# Patient Record
Sex: MALE | Race: WHITE | NOT HISPANIC OR LATINO | Employment: FULL TIME | URBAN - METROPOLITAN AREA
[De-identification: names, ages, dates, MRNs, and addresses within clinical notes are randomized per-mention and may not be internally consistent; named-entity substitution may affect disease eponyms.]

---

## 2021-04-19 NOTE — PATIENT INSTRUCTIONS
Preventive Health Recommendations  Male Ages 50 - 64    Yearly exam:             See your health care provider every year in order to  o   Review health changes.   o   Discuss preventive care.    o   Review your medicines if your doctor has prescribed any.     Have a cholesterol test every 5 years, or more frequently if you are at risk for high cholesterol/heart disease.     Have a diabetes test (fasting glucose) every three years. If you are at risk for diabetes, you should have this test more often.     Have a colonoscopy at age 50, or have a yearly FIT test (stool test). These exams will check for colon cancer.      Talk with your health care provider about whether or not a prostate cancer screening test (PSA) is right for you.    You should be tested each year for STDs (sexually transmitted diseases), if you re at risk.     Shots: Get a flu shot each year. Get a tetanus shot every 10 years.     Nutrition:    Eat at least 5 servings of fruits and vegetables daily.     Eat whole-grain bread, whole-wheat pasta and brown rice instead of white grains and rice.     Get adequate Calcium and Vitamin D.     Lifestyle    Exercise for at least 150 minutes a week (30 minutes a day, 5 days a week). This will help you control your weight and prevent disease.     Limit alcohol to one drink per day.     No smoking.     Wear sunscreen to prevent skin cancer.     See your dentist every six months for an exam and cleaning.     See your eye doctor every 1 to 2 years.      Please schedule a future laboratory appointment(s) and be sure to have fasted for 10 hours prior to arrival      While the [facility] has opened eligibility to all people, our health system will continue to prioritize those groups who are most at risk of exposure to COVID-19 and/or hospitalization due to COVID-19. Once a larger percentage of these groups are vaccinated, we will open vaccine appointments to a larger group.      We are working hard to begin vaccinating  more people against COVID-19. Beginning Wednesday, March 31, we are vaccinating:     Individuals age 50 and older.    All healthcare workers, both paid and unpaid.     People age 16 or 18-49 years with certain medical conditions or disabilities listed in Phase 1b tier 4.    People who identify as one or more of the following high-risk groups: Black/, /[place] Native, [place] , /, and /.     If you fit into one of these categories, please log in to [facility] using this link to see if we have an open appointment and schedule an appointment.      If there are no appointments left, you will be unable to schedule.   If you have technical difficulty using [facility], call [phone] for assistance.      As vaccine supply increases and we are able to open appointments to more groups, we will share that information on our website:  [email or web address]. Check this website to stay up to date on COVID-19 vaccination information.          Patient Education     Preventing Osteoporosis: Meeting Your Calcium Needs    Your body needs calcium to build and repair bones. But it can't make calcium on its own. That's why it's important to eat calcium-rich foods. Some foods are naturally rich in calcium. Others have calcium added (fortified). It's best to get calcium from the foods you eat. But if you can't get enough, you may want to take calcium supplements. To meet your daily calcium needs, try the foods listed below.  Dairy Fish & beans Other sources   Source   Calcium (mg) per serving   Source   Calcium (mg) per serving   Source   Calcium (mg) per serving   Low-fat yogurt, plain   415 mg/8 oz.   Sardines, Atlantic, canned, with bones   351 mg/3 oz.   Oatmeal, instant, fortified   215 mg/1 cup   Nonfat milk   302 mg/1 cup   [place], sockeye, canned, with bones   239 mg/3 oz.   Tofu made with calcium sulfate   204 mg/3 oz.   Low-fat  milk   297 mg/1 cup   Soybeans, fresh, boiled   131 mg/1/2 cup   Collards   179 mg/1/2 cup   Swiss cheese   272 mg/1 oz.   White beans, cooked   81 mg/1/2 cup   English muffin, whole wheat   175 mg/1 muffin   Cheddar cheese   205 mg/1 oz.   Navy beans, cooked   79 mg/1/2 cup   Kale   90 mg/1/2 cup   Ice cream strawberry   79 mg/1/2 cup           Orange, navel   56 mg/1 medium   Note: Calcium levels may vary depending on brand and size.  Daily calcium needs  14 to 18 years old: 1,300 mg  19 to 30 years old: 1,000 mg  31 to 50 years old: 1,000 mg  51 to 70 years old, women: 1,200 mg  51 to 70 years old, men: 1,000 mg  Pregnant or nursin to 18 years old: 1,300 mg, 19 to 50 years old: 1,000 mg  Older than 70 (women and men): 1,200 mg   [employer] last reviewed this educational content on 2018-2021 [employer]. All rights reserved. This information is not intended as a substitute for professional medical care. Always follow your healthcare professional's instructions.

## 2021-04-19 NOTE — PROGRESS NOTES
"  3  SUBJECTIVE:   CC: Modesto Acevedo is an 55 year old male who presents for preventive health visit.     {Split Bill scripting  The purpose of this visit is to discuss your medical history and prevent health problems before you are sick. You may be responsible for a co-pay, coinsurance, or deductible if your visit today includes services such as checking on a sore throat, having an x-ray or lab test, or treating and evaluating a new or existing condition :373270}  Patient has been advised of split billing requirements and indicates understanding: {Yes and No:044297}  Healthy Habits:    Do you get at least three servings of calcium containing foods daily (dairy, green leafy vegetables, etc.)? { :863610::\"yes\"}    Amount of exercise or daily activities, outside of work: { :236276}    Problems taking medications regularly { :187885::\"No\"}    Medication side effects: { :318703::\"No\"}    Have you had an eye exam in the past two years? { :218219}    Do you see a dentist twice per year? { :381224}    Do you have sleep apnea, excessive snoring or daytime drowsiness?{ :171053}  {Outside tests to abstract? :378248}    {additional problems to add (Optional):384101}    Today's PHQ-2 Score: No flowsheet data found.  {PHQ-2 LOOK IN ASSESSMENTS (Optional) :190667}  Abuse: Current or Past(Physical, Sexual or Emotional)- {YES/NO/NA:108118}  Do you feel safe in your environment? {YES/NO/NA:367459}    Have you ever done Advance Care Planning? (For example, a Health Directive, POLST, or a discussion with a medical provider or your loved ones about your wishes): { :027281}    Social History     Tobacco Use     Smoking status: Not on file   Substance Use Topics     Alcohol use: Not on file     If you drink alcohol do you typically have >3 drinks per day or >7 drinks per week? {ETOH :987492}                      Last PSA: No results found for: PSA    Reviewed orders with patient. Reviewed health maintenance and updated orders accordingly " "- {Yes/No:844244::\"Yes\"}  {Chronicprobdata (Optional):117850}    Reviewed and updated as needed this visit by clinical staff                 Reviewed and updated as needed this visit by Provider                {HISTORY OPTIONS (Optional):979922}    ROS:  { :586133::\"CONSTITUTIONAL: NEGATIVE for fever, chills, change in weight\",\"INTEGUMENTARY/SKIN: NEGATIVE for worrisome rashes, moles or lesions\",\"EYES: NEGATIVE for vision changes or irritation\",\"ENT: NEGATIVE for ear, mouth and throat problems\",\"RESP: NEGATIVE for significant cough or SOB\",\"CV: NEGATIVE for chest pain, palpitations or peripheral edema\",\"GI: NEGATIVE for nausea, abdominal pain, heartburn, or change in bowel habits\",\" male: negative for dysuria, hematuria, decreased urinary stream, erectile dysfunction, urethral discharge\",\"MUSCULOSKELETAL: NEGATIVE for significant arthralgias or myalgia\",\"NEURO: NEGATIVE for weakness, dizziness or paresthesias\",\"PSYCHIATRIC: NEGATIVE for changes in mood or affect\"}    OBJECTIVE:   There were no vitals taken for this visit.  EXAM:  {Exam Choices:727125}    {Diagnostic Test Results (Optional):168673::\"Diagnostic Test Results:\",\"Labs reviewed in Epic\"}    ASSESSMENT/PLAN:   {Diag Picklist:401407}    Patient has been advised of split billing requirements and indicates understanding: {YES / NO:078490::\"Yes\"}  COUNSELING:  {MALE COUNSELING MESSAGES:695899::\"Reviewed preventive health counseling, as reflected in patient instructions\"}    There is no height or weight on file to calculate BMI.    {Weight Management Plan (ACO) Complete if BMI is abnormal-  Ages 18-64  BMI >24.9.  Age 65+ with BMI <23 or >30 (Optional):373289}    He has no history on file for tobacco.      Counseling Resources:  ATP IV Guidelines  Pooled Cohorts Equation Calculator  FRAX Risk Assessment  ICSI Preventive Guidelines  Dietary Guidelines for Americans, 2010  USDA's MyPlate  ASA Prophylaxis  Lung CA Screening    Wilbert Canales MD   HEALTH " [facility]

## 2021-04-20 ENCOUNTER — OFFICE VISIT (OUTPATIENT)
Dept: FAMILY MEDICINE | Facility: CLINIC | Age: 56
End: 2021-04-20
Payer: COMMERCIAL

## 2021-04-20 VITALS
DIASTOLIC BLOOD PRESSURE: 74 MMHG | HEIGHT: 68 IN | TEMPERATURE: 97.7 F | HEART RATE: 59 BPM | SYSTOLIC BLOOD PRESSURE: 123 MMHG | WEIGHT: 194 LBS | OXYGEN SATURATION: 97 % | BODY MASS INDEX: 29.4 KG/M2

## 2021-04-20 DIAGNOSIS — N40.1 BENIGN PROSTATIC HYPERPLASIA WITH NOCTURIA: ICD-10-CM

## 2021-04-20 DIAGNOSIS — Z00.00 ROUTINE GENERAL MEDICAL EXAMINATION AT A HEALTH CARE FACILITY: Primary | ICD-10-CM

## 2021-04-20 DIAGNOSIS — Z83.3 FAMILY HISTORY OF DIABETES MELLITUS: ICD-10-CM

## 2021-04-20 DIAGNOSIS — Z11.59 ENCOUNTER FOR HEPATITIS C SCREENING TEST FOR LOW RISK PATIENT: ICD-10-CM

## 2021-04-20 DIAGNOSIS — Z12.11 SCREEN FOR COLON CANCER: ICD-10-CM

## 2021-04-20 DIAGNOSIS — R35.1 BENIGN PROSTATIC HYPERPLASIA WITH NOCTURIA: ICD-10-CM

## 2021-04-20 DIAGNOSIS — Z29.9 PREVENTIVE MEASURE: ICD-10-CM

## 2021-04-20 DIAGNOSIS — Z13.220 SCREENING FOR HYPERLIPIDEMIA: ICD-10-CM

## 2021-04-20 PROCEDURE — 99386 PREV VISIT NEW AGE 40-64: CPT | Performed by: FAMILY MEDICINE

## 2021-04-20 RX ORDER — SILDENAFIL CITRATE 20 MG/1
TABLET ORAL
COMMUNITY
Start: 2019-06-07

## 2021-04-20 RX ORDER — DUTASTERIDE 0.5 MG/1
0.5 CAPSULE, LIQUID FILLED ORAL DAILY
Qty: 90 CAPSULE | Refills: 3 | Status: SHIPPED | OUTPATIENT
Start: 2021-04-20

## 2021-04-20 SDOH — HEALTH STABILITY: MENTAL HEALTH: HOW OFTEN DO YOU HAVE 6 OR MORE DRINKS ON ONE OCCASION?: NEVER

## 2021-04-20 SDOH — HEALTH STABILITY: MENTAL HEALTH: HOW MANY STANDARD DRINKS CONTAINING ALCOHOL DO YOU HAVE ON A TYPICAL DAY?: NOT ASKED

## 2021-04-20 SDOH — HEALTH STABILITY: MENTAL HEALTH: HOW OFTEN DO YOU HAVE A DRINK CONTAINING ALCOHOL?: NEVER

## 2021-04-20 ASSESSMENT — ENCOUNTER SYMPTOMS
CHILLS: 0
HEADACHES: 0
HEMATURIA: 0
ABDOMINAL PAIN: 0
MYALGIAS: 0
JOINT SWELLING: 0
FEVER: 0
EYE PAIN: 0
DIZZINESS: 0
FREQUENCY: 1
HEMATOCHEZIA: 1
NAUSEA: 0
DYSURIA: 0
COUGH: 0
WEAKNESS: 0
PALPITATIONS: 0
ARTHRALGIAS: 0
PARESTHESIAS: 0
SORE THROAT: 0
NERVOUS/ANXIOUS: 1
HEARTBURN: 0
CONSTIPATION: 0
DIARRHEA: 0
SHORTNESS OF BREATH: 0

## 2021-04-20 ASSESSMENT — PAIN SCALES - GENERAL: PAINLEVEL: NO PAIN (0)

## 2021-04-20 ASSESSMENT — MIFFLIN-ST. JEOR: SCORE: 1675.51

## 2021-04-20 NOTE — NURSING NOTE
"Chief Complaint   Patient presents with     Physical     Establish Care     Health Maintenance     PHQ2, Adv Dir,       Initial /77   Pulse 59   Temp 97.7  F (36.5  C) (Tympanic)   Ht 1.721 m (5' 7.75\")   Wt 88 kg (194 lb)   SpO2 97%   BMI 29.72 kg/m   Estimated body mass index is 29.72 kg/m  as calculated from the following:    Height as of this encounter: 1.721 m (5' 7.75\").    Weight as of this encounter: 88 kg (194 lb).  Medication Reconciliation: complete  Tonya Camacho CMA  "

## 2021-04-20 NOTE — PROGRESS NOTES
SUBJECTIVE:   CC: Modesto Acevedo is an 55 year old male who presents for preventative health visit.       Patient has been advised of split billing requirements and indicates understanding: Yes  Healthy Habits:     Getting at least 3 servings of Calcium per day:  Yes    Bi-annual eye exam:  Yes    Dental care twice a year:  Yes    Sleep apnea or symptoms of sleep apnea:  None    Diet:  Regular (no restrictions)    Frequency of exercise:  6-7 days/week    Duration of exercise:  Greater than 60 minutes    Taking medications regularly:  Yes    Medication side effects:  Not applicable    PHQ-2 Total Score: 2    Additional concerns today:  No              Today's PHQ-2 Score:   PHQ-2 ( 1999 Pf[employer]) 4/20/2021   Q1: Little interest or pleasure in doing things 1   Q2: Feeling down, depressed or hopeless 1   PHQ-2 Score 2   Q1: Little interest or pleasure in doing things Several days   Q2: Feeling down, depressed or hopeless Several days   PHQ-2 Score 2       Abuse: Current or Past(Physical, Sexual or Emotional)- No  Do you feel safe in your environment? Yes    Have you ever done Advance Care Planning? (For example, a Health Directive, POLST, or a discussion with a medical provider or your loved ones about your wishes): No, advance care planning information given to patient to review.  Advanced care planning was discussed at today's visit.    Social History     Tobacco Use     Smoking status: Former Smoker     Smokeless tobacco: Never Used   Substance Use Topics     Alcohol use: Never     Frequency: Never     Binge frequency: Never         Alcohol Use 4/20/2021   Prescreen: >3 drinks/day or >7 drinks/week? Not Applicable       Last PSA: No results found for: PSA    Reviewed orders with patient. Reviewed health maintenance and updated orders accordingly -       Reviewed and updated as needed this visit by clinical staff  Tobacco  Allergies  Meds   Med Hx  Surg Hx  Fam Hx  Soc Hx        Reviewed and updated as needed this  "visit by Provider  Tobacco                   Review of Systems   Constitutional: Negative for chills and fever.   HENT: Negative for congestion, ear pain, hearing loss and sore throat.    Eyes: Negative for pain and visual disturbance.   Respiratory: Negative for cough and shortness of breath.    Cardiovascular: Negative for chest pain, palpitations and peripheral edema.   Gastrointestinal: Positive for hematochezia. Negative for abdominal pain, constipation, diarrhea, heartburn and nausea.   Genitourinary: Positive for frequency and urgency. Negative for discharge, dysuria, genital sores and hematuria.   Musculoskeletal: Negative for arthralgias, joint swelling and myalgias.   Skin: Negative for rash.   Neurological: Negative for dizziness, weakness, headaches and paresthesias.   Psychiatric/Behavioral: Negative for mood changes. The patient is nervous/anxious.          OBJECTIVE:   /74   Pulse 59   Temp 97.7  F (36.5  C) (Tympanic)   Ht 1.721 m (5' 7.75\")   Wt 88 kg (194 lb)   SpO2 97%   BMI 29.72 kg/m      Physical Exam          ASSESSMENT/PLAN:       ICD-10-CM    1. Routine general medical examination at a health care facility  Z00.00    2. Screen for colon cancer  Z12.11 GASTROENTEROLOGY ADULT REF PROCEDURE ONLY   3. Screening for hyperlipidemia  Z13.220 Lipid panel reflex to direct LDL Fasting   4. Encounter for hepatitis C screening test for low risk patient  Z11.59 Hepatitis C Screen Reflex to HCV RNA Quant and Genotype   5. Family history of diabetes mellitus  Z83.3 Comprehensive metabolic panel   6. Preventive measure  Z29.9 aspirin (ASA) 81 MG EC tablet   7. Benign prostatic hyperplasia with nocturia  N40.1 dutasteride (AVODART) 0.5 MG capsule    R35.1        Patient has been advised of split billing requirements and indicates understanding: Yes  COUNSELING:   Reviewed preventive health counseling, as reflected in patient instructions       Regular exercise       Healthy diet/nutrition       " "Vision screening       Aspirin prophylaxis        Family planning       Consider Hep C screening for all patients one time for ages 18-79 years       Colon cancer screening       Prostate cancer screening       Osteoporosis prevention/bone health       One time pneumovax for smokers    Estimated body mass index is 29.72 kg/m  as calculated from the following:    Height as of this encounter: 1.721 m (5' 7.75\").    Weight as of this encounter: 88 kg (194 lb).     Weight management plan: Discussed healthy diet and exercise guidelines    He reports that he has quit smoking. He has never used smokeless tobacco.      Counseling Resources:  ATP IV Guidelines  Pooled Cohorts Equation Calculator  FRAX Risk Assessment  ICSI Preventive Guidelines  Dietary Guidelines for Americans, 2010  Dubaki's MyPlate  ASA Prophylaxis  Lung CA Screening    Wilbert Canales MD  [facility]  --------------------------------------------------------------------------------------------------------------------------------------  SUBJECTIVE:  Modesto Acevedo is a 55 year old male who presents to the clinic today for a routine physical exam.    The patient's last physical was  8-15-19    No results found for: CHOL  No results found for: HDL  No results found for: LDL  No results found for: TRIG  No results found for: CHOLHDLRATIO  The patient's last fasting lipid panel was done  years ago and the results are unknown to the patient.      The ASCVD Risk score (Ainsley SAVAGE Jr., et al., 2013) failed to calculate for the following reasons:    Cannot find a previous HDL lab    Cannot find a previous total cholesterol lab      Risk Enhancers:  Family history of premature ASCVD- No  LDL >159- Unknown  Chronic kidney disease- No  Metabolic Syndrome- No  Premature menopause- No  Inflammatory conditions (RA, psoriasis, HIV)- No  SE  Ancestry- No  Triglycerides >174- No      The patient reports that he has never been treated for high blood " pressure.    The patient reports that he does not take a daily aspirin.    No results found for: HCVAB  The patient reports that he has not been screened for Hepatitis C    (Screen all baby boomers once per CDC-- the generation born from 1945 through 1965 and per USPTF screen age 19 to 79 especially younger people who have used IV drugs)  He would like to have an Hepatitis C test today    No results found for: HIAGAB  The patient reports that he has not been screened for HIV   (Screen all 15 to 64 years old)  He would not like to have an HIV test today      Immunization History   Administered Date(s) Administered     TDAP Vaccine (Adacel) 03/12/2009     Td (Adult), Adsorbed 08/15/2019     The patient's believes that his last tetanus shot was given 2 year(s) ago.   The patient believes that he has not had a Shingrix in the past  The patient believes that he has not had a PPSV23 in the past.  The patient believes that he has not had a PCV13 in the past.  The patient believes that he has not had a seasonal flu vaccination this fall or winter.  The patient would like to have acv vaccinations asap      No results found for this or any previous visit.]   The patient denies a family history of colon cancer.  The patient reports that he has had a colonoscopy.20 years ago.  This was negative.   The patient reports that he and his wife or partner are not using contraception as she has gone through menopause.    The patient denies a family history of diabetes.  The patient reports a family history of prostate cancer in 2 grandfathers and an uncle.    After discussing the pros and cons of checking a PSA he  Does not want to have this test drawn today.   The patient reports that he eats or drinks 1 servings of dairy products per day. He does take a multivitamin with calcium once daily.  The patient reports that he has dental appointments approximately every 6 months.  The patient reports that he  has an eye examination  approximately every 2 year(s).    Do you currently smoke? No  How many years have you smoked? 5   How many packs per day did you smoke on average? 1 ppd  (if more than 30 pack year history and the patient is age 55-80 consider ordering an annual low dose radiation lung CT to screen for cancer)  (Do not order if patient has quit more than 15 years ago or has a health condition that limits life expectancy or could not tolerate curative lung surgery)  Are you interested having a lung CT to screen for lung cancer? N/A    If the patient has smoked more that 100 cigarettes, has the patient had an imaging study (US or CT) for an AAA between the ages of 65 and 75? N/A              There is no problem list on file for this patient.      History reviewed. No pertinent surgical history.    Family History   Problem Relation Age of Onset     Coronary Artery Disease Father      Heart Disease Father      Hypertension Father        Social History     Socioeconomic History     Marital status:      Spouse name: Not on file     Number of children: Not on file     Years of education: Not on file     Highest education level: Not on file   Occupational History     Not on file   Social Needs     Financial resource strain: Not on file     Food insecurity     Worry: Not on file     Inability: Not on file     Transportation needs     Medical: Not on file     Non-medical: Not on file   Tobacco Use     Smoking status: Former Smoker     Smokeless tobacco: Never Used   Substance and Sexual Activity     Alcohol use: Never     Frequency: Never     Binge frequency: Never     Drug use: Never     Sexual activity: Not on file   Lifestyle     Physical activity     Days per week: Not on file     Minutes per session: Not on file     Stress: Not on file   Relationships     Social connections     Talks on phone: Not on file     Gets together: Not on file     Attends Orthodoxy service: Not on file     Active member of club or organization: Not on  "file     Attends meetings of clubs or organizations: Not on file     Relationship status: Not on file     Intimate partner violence     Fear of current or ex partner: Not on file     Emotionally abused: Not on file     Physically abused: Not on file     Forced sexual activity: Not on file   Other Topics Concern     Not on file   Social History Narrative     Not on file       Current Outpatient Medications   Medication Sig Dispense Refill     Misc Natural Products (PROSTATE SUPPORT PO)        Multiple Vitamin (MULTI VITAMIN DAILY PO)        S-Adenosylmethionine (EDGAR-E COMPLETE PO)        sildenafil (REVATIO) 20 MG tablet take 1-2 tablets by mouth once daily if needed           PHYSICAL EXAMINATION:  Blood pressure 123/74, pulse 59, temperature 97.7  F (36.5  C), temperature source Tympanic, height 1.721 m (5' 7.75\"), weight 88 kg (194 lb), SpO2 97 %.  General appearance - healthy, alert and no distress  Skin - Skin color, texture, turgor normal. No rashes or lesions.  Head - Normocephalic. No masses, lesions, tenderness or abnormalities  Eyes - conjunctivae/corneas clear. PERRL, EOM's intact. Fundi benign  Ears - External ears normal. Canals clear. TM's normal.  Nose/Sinuses - Nares normal. Septum midline. Mucosa normal. No drainage or sinus tenderness.  Oropharynx - Lips, mucosa, and tongue normal. Teeth and gums normal.  Neck - Neck supple. No adenopathy. Thyroid symmetric, normal size,  Lungs - Percussion normal. Good diaphragmatic excursion. Lungs clear  Heart - PMI normal. No lifts, heaves, or thrills. RRR. No murmurs, clicks gallops or rub  Abdomen - Abdomen soft, non-tender. BS normal. No masses, organomegaly  Extremities - Extremities normal. No deformities, edema, or skin discoloration.  Musculoskeletal - Spine ROM normal. Muscular strength intact.  Peripheral pulses - radial=4/4, femoral=4/4, popliteal=4/4, dorsalis pedis=4/4,  Neuro - Gait normal. Reflexes normal and symmetric. Sensation grossly " WNL.  Genitalia - Penis normal. No urethral discharge. Scrotum normal to palpation. No hernia.  Rectal - Rectal negative. Prostate palpation negative.  No rectal masses or abnormalities, positive findings: prostate 3+      No results found for any previous visit.       ASSESSMENT:    ICD-10-CM    1. Routine general medical examination at a health care facility  Z00.00    2. Screen for colon cancer  Z12.11 Fecal colorectal cancer screen FIT - Future (S+30)   3. Screening for hyperlipidemia  Z13.220        Well-Adult Physical Exam.  Health Maintenance Due   Topic Date Due     COLORECTAL CANCER SCREENING  Never done     HIV SCREENING  Never done     COVID-19 Vaccine (1) Never done     HEPATITIS C SCREENING  Never done     LIPID  Never done     ZOSTER IMMUNIZATION (1 of 2) Never done     INFLUENZA VACCINE (1) Never done     Health Maintenance   Topic Date Due     COLORECTAL CANCER SCREENING  Never done     HIV SCREENING  Never done     COVID-19 Vaccine (1) Never done     HEPATITIS C SCREENING  Never done     LIPID  Never done     ZOSTER IMMUNIZATION (1 of 2) Never done     INFLUENZA VACCINE (1) Never done     PREVENTIVE CARE VISIT  04/20/2022     ADVANCE CARE PLANNING  04/20/2026     DTAP/TDAP/TD IMMUNIZATION (3 - Td) 08/15/2029     PHQ-2  Completed     Pneumococcal Vaccine: Pediatrics (0 to 5 Years) and At-Risk Patients (6 to 64 Years)  Aged Out     IPV IMMUNIZATION  Aged Out     MENINGITIS IMMUNIZATION  Aged Out     HEPATITIS B IMMUNIZATION  Aged Out         HEALTH CARE MAINTENENCE: The recommended screening tests and vaccinatons for this patient have been discussed as above.  The appropriate tests and vaccinations  have been ordered or declined by the patient. Please see the orders in EPIC.The patient specifically declines: Shingrix as he would like to get the coronavirus vaccination(s) as soon as possible, PPSV23    Immunization Status:  up to date and documented except for Shingrix and coronavirus     Patient  Active Problem List   Diagnosis     Benign prostatic hyperplasia with nocturia     Family history of diabetes mellitus        ATP III Guidelines  ICSI Preventive Guidelines    PLAN:   Check a fasting lipid profile  I recommended to take a daily aspirin (81 to 325 mg)  Hepatitis C antibody ordered  HIV testing was discussed but the pt declined  Shingrix recommended  Coronavirus  vaccine recommended  Colonoscopy recommended  Check a fasting glucose  Checking a PSA was discussed but the pt declined  Discussed calcium intake, vitamins and supplements. Recommended 1000 mg of calcium daily  Sunscreen use was recommended especially in the area of tatoos  Recommended dental exams every 6 months  Recommended eye exam every 1-2 years  Follow up in 1 year for the next preventative medical visit    Body mass index is 29.72 kg/m .      (N40.1,  R35.1) Benign prostatic hyperplasia with nocturia  Comment:   Plan: dutasteride (AVODART) 0.5 MG capsule

## 2021-04-28 ENCOUNTER — TELEPHONE (OUTPATIENT)
Dept: FAMILY MEDICINE | Facility: CLINIC | Age: 56
End: 2021-04-28

## 2021-04-28 NOTE — TELEPHONE ENCOUNTER
Date of procedure: 5/28  Colonoscopy  Surgeon: Dr. Borges  Prep:Miralax  Packet:Colonoscopy/EGD instructions were sent to the patient in [facility].   Date: 4/28/2021      Surgery Scheduler

## 2021-04-28 NOTE — LETTER

## 2021-05-14 DIAGNOSIS — Z11.59 ENCOUNTER FOR SCREENING FOR OTHER VIRAL DISEASES: ICD-10-CM

## 2021-05-24 DIAGNOSIS — Z11.59 ENCOUNTER FOR SCREENING FOR OTHER VIRAL DISEASES: ICD-10-CM

## 2021-05-24 LAB
LABORATORY COMMENT REPORT: NORMAL
SARS-COV-2 RNA RESP QL NAA+PROBE: NEGATIVE
SARS-COV-2 RNA RESP QL NAA+PROBE: NORMAL
SPECIMEN SOURCE: NORMAL
SPECIMEN SOURCE: NORMAL

## 2021-05-24 PROCEDURE — U0005 INFEC AGEN DETEC AMPLI PROBE: HCPCS | Performed by: SURGERY

## 2021-05-24 PROCEDURE — U0003 INFECTIOUS AGENT DETECTION BY NUCLEIC ACID (DNA OR RNA); SEVERE ACUTE RESPIRATORY SYNDROME CORONAVIRUS 2 (SARS-COV-2) (CORONAVIRUS DISEASE [COVID-19]), AMPLIFIED PROBE TECHNIQUE, MAKING USE OF HIGH THROUGHPUT TECHNOLOGIES AS DESCRIBED BY CMS-2020-01-R: HCPCS | Performed by: SURGERY

## 2021-05-27 ENCOUNTER — ANESTHESIA EVENT (OUTPATIENT)
Dept: GASTROENTEROLOGY | Facility: CLINIC | Age: 56
End: 2021-05-27
Payer: COMMERCIAL

## 2021-05-27 ASSESSMENT — LIFESTYLE VARIABLES: TOBACCO_USE: 1

## 2021-05-27 NOTE — ANESTHESIA PREPROCEDURE EVALUATION
Anesthesia Pre-Procedure Evaluation    Patient: Modesto Acevedo   MRN: [id number] : 1965        Preoperative Diagnosis: Special screening for malignant neoplasms, colon [Z12.11]   Procedure : Procedure(s):  Colonoscopy with possible biopsy and/or polypectomy     History reviewed. No pertinent past medical history.   History reviewed. No pertinent surgical history.   No Known Allergies   Social History     Tobacco Use     Smoking status: Former Smoker     Smokeless tobacco: Never Used   Substance Use Topics     Alcohol use: Never     Frequency: Never     Binge frequency: Never      Wt Readings from Last 1 Encounters:   21 88 kg (194 lb)        Anesthesia Evaluation   Pt has not had prior anesthetic         ROS/MED HX  ENT/Pulmonary:     (+) tobacco use, Past use,     Neurologic:  - neg neurologic ROS     Cardiovascular:  - neg cardiovascular ROS     METS/Exercise Tolerance:     Hematologic:  - neg hematologic  ROS     Musculoskeletal:  - neg musculoskeletal ROS     GI/Hepatic:  - neg GI/hepatic ROS     Renal/Genitourinary:  - neg Renal ROS     Endo:  - neg endo ROS     Psychiatric/Substance Use:  - neg psychiatric ROS     Infectious Disease:  - neg infectious disease ROS     Malignancy:  - neg malignancy ROS     Other:  - neg other ROS          Physical Exam    Airway  airway exam normal      Mallampati: II   TM distance: > 3 FB   Neck ROM: full   Mouth opening: > 3 cm    Respiratory Devices and Support         Dental           Cardiovascular   cardiovascular exam normal       Rhythm and rate: regular and normal     Pulmonary   pulmonary exam normal        breath sounds clear to auscultation           OUTSIDE LABS:  CBC: No results found for: WBC, HGB, HCT, PLT  BMP: No results found for: NA, POTASSIUM, CHLORIDE, CO2, BUN, CR, GLC  COAGS: No results found for: PTT, INR, FIBR  POC: No results found for: BGM, HCG, HCGS  HEPATIC: No results found for: ALBUMIN, PROTTOTAL, ALT, AST, GGT, ALKPHOS, BILITOTAL,  BILIDIRECT, DOLORES  OTHER: No results found for: PH, LACT, A1C, ZAHIDA, PHOS, MAG, LIPASE, AMYLASE, TSH, T4, T3, CRP, SED    Anesthesia Plan    ASA Status:  2   NPO Status:  NPO Appropriate    Anesthesia Type: MAC.      Maintenance: TIVA.        Consents    Anesthesia Plan(s) and associated risks, benefits, and realistic alternatives discussed. Questions answered and patient/representative(s) expressed understanding.     - Discussed with:  Patient    Use of blood products discussed: No .     Postoperative Care    Pain management: IV analgesics, Oral pain medications.   PONV prophylaxis: Ondansetron (or other 5HT-3), Dexamethasone or Solumedrol     Comments:    The risks and benefits of anesthesia, and the alternatives where applicable, have been discussed with the patient, and they wish to proceed.            MARTY Sierra CRNA

## 2021-05-28 ENCOUNTER — ANESTHESIA (OUTPATIENT)
Dept: GASTROENTEROLOGY | Facility: CLINIC | Age: 56
End: 2021-05-28
Payer: COMMERCIAL

## 2021-05-28 ENCOUNTER — HOSPITAL ENCOUNTER (OUTPATIENT)
Facility: CLINIC | Age: 56
Discharge: HOME | End: 2021-05-28
Attending: SURGERY | Admitting: SURGERY
Payer: COMMERCIAL

## 2021-05-28 VITALS
SYSTOLIC BLOOD PRESSURE: 116 MMHG | RESPIRATION RATE: 16 BRPM | HEART RATE: 57 BPM | DIASTOLIC BLOOD PRESSURE: 75 MMHG | TEMPERATURE: 98 F | OXYGEN SATURATION: 98 %

## 2021-05-28 LAB — COLONOSCOPY: NORMAL

## 2021-05-28 PROCEDURE — G0121 COLON CA SCRN NOT HI RSK IND: HCPCS | Performed by: SURGERY

## 2021-05-28 PROCEDURE — 258N000003 HC RX IP 258 OP 636: Performed by: SURGERY

## 2021-05-28 PROCEDURE — 250N000011 HC RX IP 250 OP 636: Performed by: NURSE ANESTHETIST, CERTIFIED REGISTERED

## 2021-05-28 PROCEDURE — 250N000009 HC RX 250: Performed by: NURSE ANESTHETIST, CERTIFIED REGISTERED

## 2021-05-28 PROCEDURE — 370N000017 HC ANESTHESIA TECHNICAL FEE, PER MIN: Performed by: SURGERY

## 2021-05-28 PROCEDURE — 45378 DIAGNOSTIC COLONOSCOPY: CPT | Performed by: SURGERY

## 2021-05-28 RX ORDER — SODIUM CHLORIDE, SODIUM LACTATE, POTASSIUM CHLORIDE, CALCIUM CHLORIDE 600; 310; 30; 20 MG/100ML; MG/100ML; MG/100ML; MG/100ML
INJECTION, SOLUTION INTRAVENOUS CONTINUOUS
Status: DISCONTINUED | OUTPATIENT
Start: 2021-05-28 | End: 2021-05-28 | Stop reason: HOSPADM

## 2021-05-28 RX ORDER — LIDOCAINE HYDROCHLORIDE 20 MG/ML
INJECTION, SOLUTION INFILTRATION; PERINEURAL PRN
Status: DISCONTINUED | OUTPATIENT
Start: 2021-05-28 | End: 2021-05-28

## 2021-05-28 RX ORDER — PROPOFOL 10 MG/ML
INJECTION, EMULSION INTRAVENOUS PRN
Status: DISCONTINUED | OUTPATIENT
Start: 2021-05-28 | End: 2021-05-28

## 2021-05-28 RX ORDER — LIDOCAINE 40 MG/G
CREAM TOPICAL
Status: DISCONTINUED | OUTPATIENT
Start: 2021-05-28 | End: 2021-05-28 | Stop reason: HOSPADM

## 2021-05-28 RX ORDER — PROPOFOL 10 MG/ML
INJECTION, EMULSION INTRAVENOUS CONTINUOUS PRN
Status: DISCONTINUED | OUTPATIENT
Start: 2021-05-28 | End: 2021-05-28

## 2021-05-28 RX ADMIN — LIDOCAINE HYDROCHLORIDE 80 MG: 20 INJECTION, SOLUTION INFILTRATION; PERINEURAL at 09:02

## 2021-05-28 RX ADMIN — PROPOFOL 70 MG: 10 INJECTION, EMULSION INTRAVENOUS at 09:02

## 2021-05-28 RX ADMIN — SODIUM CHLORIDE, POTASSIUM CHLORIDE, SODIUM LACTATE AND CALCIUM CHLORIDE: 600; 310; 30; 20 INJECTION, SOLUTION INTRAVENOUS at 08:19

## 2021-05-28 RX ADMIN — PROPOFOL 175 MCG/KG/MIN: 10 INJECTION, EMULSION INTRAVENOUS at 09:03

## 2021-05-28 NOTE — DISCHARGE INSTRUCTIONS
[facility]    Home Care Following Endoscopy    Dr. Borges      Activity:    You have just undergone an endoscopic procedure performed with monitored anesthesia care (MAC).  Do not work or operate machinery (including a car) or drink alcohol (including beer) or sign legal papers for at least 12 hours.      I encourage you to walk and attempt to pass this air as soon as possible.    Diet:    Return to the diet you were on before your procedure but eat lightly for the first 12-24 hours.    Drink plenty of water.    Resume any regular medications unless otherwise advised by your physician.       You had NO polyp removed today.   Pain:    You may take Tylenol as needed for pain.  Expected Recovery:               You can expect some mild abdominal fullness and/or discomfort due to the air used to inflate your intestinal tract.     Call Your Physician if You Have:     After Colonoscopy:  o Worsening persisting abdominal pain which is worse with activity.  o Fevers (>101 degrees F), chills or shakes.  o Passage of continued blood with bowel movements.   Any questions or concerns about your recovery, please call [phone] or after hours [phone] [facility] Nurse Advice Line (24 hr line).    Follow-up Care:  You should follow up with your primary provider as needed.

## 2021-05-28 NOTE — ANESTHESIA POSTPROCEDURE EVALUATION
Patient: Modesto Acevedo    Procedure(s):  Colonoscopy    Diagnosis:Special screening for malignant neoplasms, colon [Z12.11]  Diagnosis Additional Information: No value filed.    Anesthesia Type:  MAC    Note:  Disposition: Outpatient   Postop Pain Control: Uneventful            Sign Out: Well controlled pain   PONV: No   Neuro/Psych: Uneventful            Sign Out: Acceptable/Baseline neuro status   Airway/Respiratory: Uneventful            Sign Out: Acceptable/Baseline resp. status   CV/Hemodynamics: Uneventful            Sign Out: Acceptable CV status   Other NRE: NONE   DID A NON-ROUTINE EVENT OCCUR? No    Event details/Postop Comments:  Pt was happy with anesthesia care.  No complications.  I will follow up with the pt if needed.           Last vitals:  Vitals:    05/28/21 0921 05/28/21 0930 05/28/21 0945   BP: 120/67 106/72 116/75   Pulse: 51 54 57   Resp: 16 16 16   Temp:      SpO2: 96% 96% 98%       Last vitals prior to Anesthesia Care Transfer:  CRNA VITALS  5/28/2021 0850 - 5/28/2021 0950      5/28/2021             Resp Rate (observed):  9          Electronically Signed By: MARTY Vitale CRNA  May 28, 2021  3:13 PM

## 2021-05-28 NOTE — ANESTHESIA CARE TRANSFER NOTE
Patient: Modesto Acevedo    Procedure(s):  Colonoscopy    Diagnosis: Special screening for malignant neoplasms, colon [Z12.11]  Diagnosis Additional Information: No value filed.    Anesthesia Type:   MAC     Note:    Oropharynx: oropharynx clear of all foreign objects and spontaneously breathing  Level of Consciousness: drowsy  Oxygen Supplementation: face mask    Independent Airway: airway patency satisfactory and stable  Dentition: dentition unchanged  Vital Signs Stable: post-procedure vital signs reviewed and stable  Report to RN Given: handoff report given  Patient transferred to: Phase II    Handoff Report: Identifed the Patient, Identified the Reponsible Provider, Reviewed the pertinent medical history, Discussed the surgical course, Reviewed Intra-OP anesthesia mangement and issues during anesthesia, Set expectations for post-procedure period and Allowed opportunity for questions and acknowledgement of understanding      Vitals: (Last set prior to Anesthesia Care Transfer)  CRNA VITALS  5/28/2021 0850 - 5/28/2021 0925      5/28/2021             Resp Rate (observed):  9        Electronically Signed By: MARTY Sierra CRNA  May 28, 2021  9:25 AM

## 2021-05-28 NOTE — H&P
[facility]    Pre-Endoscopy History and Physical     Modesto Acevedo MRN# [id number]   YOB: 1965 Age: 55 year old     Date of Procedure: 5/28/2021  Primary care provider: Wilbert Canales  Type of Endoscopy: Colonoscopy with possible biopsy, possible polypectomy  Reason for Procedure: screening  Type of Anesthesia Anticipated: MAC    HPI:    Modesto is a 55 year old male who will be undergoing the above procedure.  1st screening colonoscopy; no anticoagulation; no famhx of colon cancer    A history and physical has been performed. The patient's medications and allergies have been reviewed. The risks and benefits of the procedure and the sedation options and risks were discussed with the patient.  All questions were answered and informed consent was obtained.      He denies a personal or family history of anesthesia complications or bleeding disorders.     Patient Active Problem List   Diagnosis     Benign prostatic hyperplasia with nocturia     Family history of diabetes mellitus        History reviewed. No pertinent past medical history.     History reviewed. No pertinent surgical history.    Social History     Tobacco Use     Smoking status: Former Smoker     Smokeless tobacco: Never Used   Substance Use Topics     Alcohol use: Never     Frequency: Never     Binge frequency: Never       Family History   Problem Relation Age of Onset     Coronary Artery Disease Father      Heart Disease Father      Hypertension Father        Prior to Admission medications    Medication Sig Start Date End Date Taking? Authorizing Provider   aspirin (ASA) 81 MG EC tablet Take 1 tablet (81 mg) by mouth daily 4/20/21   Wilbert Canales MD   dutasteride (AVODART) 0.5 MG capsule Take 1 capsule (0.5 mg) by mouth daily 4/20/21   Wilbert Canales MD   Misc Natural Products (PROSTATE SUPPORT PO)     Reported, Patient   Multiple Vitamin (MULTI VITAMIN DAILY PO)     Reported, Patient   S-Adenosylmethionine  "([facility]- COMPLETE PO)     Reported, Patient   sildenafil (REVATIO) 20 MG tablet take 1-2 tablets by mouth once daily if needed 6/7/19   Reported, Patient       No Known Allergies     REVIEW OF SYSTEMS:   5 point ROS negative except as noted above in HPI, including Gen., Resp., CV, GI &  system review.    PHYSICAL EXAM:   There were no vitals taken for this visit. Estimated body mass index is 29.72 kg/m  as calculated from the following:    Height as of 4/20/21: 1.721 m (5' 7.75\").    Weight as of 4/20/21: 88 kg (194 lb).   Constitutional: Awake, alert, no acute distress.  Eyes: No scleral icterus.  Conjunctiva are without injection.  ENMT: Mucous membranes moist, dentition and gums are intact.   Neck: Soft, supple, trachea midline.    Endocrine: n/a   Lymphatic: There is no cervical, submandibularadenopathy.  Respiratory: Lungs are clear to auscultation and percussion bilaterally.   Cardiovascular: Regular rate and rhythm. No murmurs, rubs, or gallops.    Abdomen: Non-distended, non-tender, normoactive bowel sounds present, No masses,  Musculoskeletal: No spinal or CVA tenderness. Full range of motion in the upper and lower extremities.    Skin: No skin rashes or lesions to inspection.  No petechia.    Neurologic: Cranial nerves II through XII are grossly intact and symmetric.  Psychiatric: The patient is alert and oriented times 3.  The patient's affect is not blunted and mood is appropriate.  DIAGNOSTICS:    Not indicated    IMPRESSION   ASA Class 2 - Mild systemic disease    PLAN:   Plan for Colonoscopy with possible biopsy, possible polypectomy. We discussed the risks, benefits and alternatives and the patient wished to proceed.  Patient is cleared for the above procedure.    The above has been forwarded to the consulting provider.    [facility], DO  [facility]        "

## 2021-06-05 DIAGNOSIS — Z13.220 SCREENING FOR HYPERLIPIDEMIA: ICD-10-CM

## 2021-06-05 DIAGNOSIS — Z11.59 ENCOUNTER FOR HEPATITIS C SCREENING TEST FOR LOW RISK PATIENT: ICD-10-CM

## 2021-06-05 DIAGNOSIS — Z83.3 FAMILY HISTORY OF DIABETES MELLITUS: ICD-10-CM

## 2021-06-05 PROCEDURE — 80061 LIPID PANEL: CPT | Performed by: FAMILY MEDICINE

## 2021-06-05 PROCEDURE — 36415 COLL VENOUS BLD VENIPUNCTURE: CPT | Performed by: FAMILY MEDICINE

## 2021-06-05 PROCEDURE — 86803 HEPATITIS C AB TEST: CPT | Performed by: FAMILY MEDICINE

## 2021-06-05 PROCEDURE — 80053 COMPREHEN METABOLIC PANEL: CPT | Performed by: FAMILY MEDICINE

## 2021-06-06 LAB — HCV AB SERPL QL IA: NONREACTIVE

## 2021-06-07 LAB
ALBUMIN SERPL-MCNC: 3.9 G/DL (ref 3.4–5)
ALP SERPL-CCNC: 63 U/L (ref 40–150)
ALT SERPL W P-5'-P-CCNC: 32 U/L (ref 0–70)
ANION GAP SERPL CALCULATED.3IONS-SCNC: 6 MMOL/L (ref 3–14)
AST SERPL W P-5'-P-CCNC: 24 U/L (ref 0–45)
BILIRUB SERPL-MCNC: 0.9 MG/DL (ref 0.2–1.3)
BUN SERPL-MCNC: 26 MG/DL (ref 7–30)
CALCIUM SERPL-MCNC: 8.8 MG/DL (ref 8.5–10.1)
CHLORIDE SERPL-SCNC: 107 MMOL/L (ref 94–109)
CHOLEST SERPL-MCNC: 166 MG/DL
CO2 SERPL-SCNC: 24 MMOL/L (ref 20–32)
CREAT SERPL-MCNC: 1.01 MG/DL (ref 0.66–1.25)
GFR SERPL CREATININE-BSD FRML MDRD: 82 ML/MIN/{1.73_M2}
GLUCOSE SERPL-MCNC: 86 MG/DL (ref 70–99)
HDLC SERPL-MCNC: 64 MG/DL
LDLC SERPL CALC-MCNC: 91 MG/DL
NONHDLC SERPL-MCNC: 102 MG/DL
POTASSIUM SERPL-SCNC: 4.3 MMOL/L (ref 3.4–5.3)
PROT SERPL-MCNC: 7 G/DL (ref 6.8–8.8)
SODIUM SERPL-SCNC: 137 MMOL/L (ref 133–144)
TRIGL SERPL-MCNC: 56 MG/DL

## 2021-06-07 NOTE — RESULT ENCOUNTER NOTE
Modesto,  I have reviewed the results of the laboratory tests that we recently ordered. All of the laboratory that are back so far are normal or considered normal for you. There are still some labs pending and I will let you know those results when they   are available.  Sincerely,   Wilbert Canales MD

## 2021-06-07 NOTE — RESULT ENCOUNTER NOTE
Modesto,  I have reviewed the results of the laboratory tests that we recently ordered. All of the lab work performed was normal or considered normal for you.  Sincerely,   Wilbert Canales MD

## 2021-10-17 ENCOUNTER — HEALTH MAINTENANCE LETTER (OUTPATIENT)
Age: 56
End: 2021-10-17

## 2021-11-04 ENCOUNTER — OFFICE VISIT (OUTPATIENT)
Dept: FAMILY MEDICINE | Facility: CLINIC | Age: 56
End: 2021-11-04
Payer: COMMERCIAL

## 2021-11-04 VITALS
OXYGEN SATURATION: 72 % | DIASTOLIC BLOOD PRESSURE: 79 MMHG | RESPIRATION RATE: 12 BRPM | SYSTOLIC BLOOD PRESSURE: 160 MMHG | BODY MASS INDEX: 28.95 KG/M2 | TEMPERATURE: 96.9 F | WEIGHT: 189 LBS | HEART RATE: 72 BPM

## 2021-11-04 DIAGNOSIS — F41.9 ANXIETY: ICD-10-CM

## 2021-11-04 DIAGNOSIS — F42.9 OBSESSIVE-COMPULSIVE DISORDER, UNSPECIFIED TYPE: Primary | ICD-10-CM

## 2021-11-04 PROCEDURE — 99213 OFFICE O/P EST LOW 20 MIN: CPT | Performed by: FAMILY MEDICINE

## 2021-11-04 PROCEDURE — 96127 BRIEF EMOTIONAL/BEHAV ASSMT: CPT | Performed by: FAMILY MEDICINE

## 2021-11-04 RX ORDER — FLUVOXAMINE MALEATE 100 MG
TABLET ORAL
Qty: 30 TABLET | Refills: 1 | Status: SHIPPED | OUTPATIENT
Start: 2021-11-04

## 2021-11-04 ASSESSMENT — PAIN SCALES - GENERAL: PAINLEVEL: NO PAIN (0)

## 2021-11-04 ASSESSMENT — PATIENT HEALTH QUESTIONNAIRE - PHQ9
5. POOR APPETITE OR OVEREATING: SEVERAL DAYS
SUM OF ALL RESPONSES TO PHQ QUESTIONS 1-9: 16

## 2021-11-04 ASSESSMENT — ANXIETY QUESTIONNAIRES
3. WORRYING TOO MUCH ABOUT DIFFERENT THINGS: NEARLY EVERY DAY
6. BECOMING EASILY ANNOYED OR IRRITABLE: SEVERAL DAYS
5. BEING SO RESTLESS THAT IT IS HARD TO SIT STILL: SEVERAL DAYS
7. FEELING AFRAID AS IF SOMETHING AWFUL MIGHT HAPPEN: NEARLY EVERY DAY
1. FEELING NERVOUS, ANXIOUS, OR ON EDGE: NEARLY EVERY DAY
2. NOT BEING ABLE TO STOP OR CONTROL WORRYING: SEVERAL DAYS
IF YOU CHECKED OFF ANY PROBLEMS ON THIS QUESTIONNAIRE, HOW DIFFICULT HAVE THESE PROBLEMS MADE IT FOR YOU TO DO YOUR WORK, TAKE CARE OF THINGS AT HOME, OR GET ALONG WITH OTHER PEOPLE: SOMEWHAT DIFFICULT
GAD7 TOTAL SCORE: 13

## 2021-11-04 NOTE — PROGRESS NOTES
"SUBJECTIVE:  Modesto Acevedo is a 56 year old male who presents for an evaluation of possible anxiety.   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 The route should have taken 10 minutes took him a few hours    Over his life his symptom(s) have ebbed and flowed.  He worried about getting cancer and dying  The latest is that he is worried about getting dementia after not being able to remember somebodies name.     He frequently tests his memory of all the people he knows.     He gets to \"a panic mode\" and this can last minutes to an hour or so.     He tried some meditation.   He is working on his diet  He is exercising more.     He is contemplating getting  and the unknowns that this entails makes him very anxious.      He is interested in therapy.   He saw a therapist a few years ago but he was disappointed.         GEORGE-7    Over the last 2 weeks, how often have you been bothered by the following problems?  (Use an \"x\" to indicate your answer) Not at all                (0) Several days                (1) More than half the days        (2) Nearly every day          (3)   1. Feeling nervous, anxious or on edge    x   2. Not being able to stop or control worrying  x     3. Worrying too much about different things    x   4. Trouble relaxing  x     5. Being so restless that it is hard to sit still  x     6. Becoming easily annoyed or irritable  x     7. Feeling afraid as if something awful might happen    x     Total_______    Cut points for:   Mild Anxiety =  5  Moderate= 10  Severe=  15    Last PHQ-9 score on record= 16    The patient reports that he has not attended mental health counseling for his current symptoms.  He does nothave a mental health appointment scheduled for the near future.    OBJECTIVE:  General: the patient had a calm but very focused  affect during the visit today.    ASSESSMENT: OCD "       PLAN:  We will start the patient on luvox 50 for a week and then to 100 mg continuously after that. I instructed the patient return to clinic for appointment in 1 month(s).     The patient was recommended to seek individual counseling and a referral was made to [facility] .

## 2021-11-04 NOTE — NURSING NOTE
"Chief Complaint   Patient presents with     Anxiety     has had it on and off through life. worse lately. feels a little panic at times       Initial BP (!) 160/79   Pulse 72   Temp 96.9  F (36.1  C) (Tympanic)   Resp 12   Wt 85.7 kg (189 lb)   SpO2 (!) 72%   BMI 28.95 kg/m   Estimated body mass index is 28.95 kg/m  as calculated from the following:    Height as of 4/20/21: 1.721 m (5' 7.75\").    Weight as of this encounter: 85.7 kg (189 lb).  Medication Reconciliation: complete  Tonya Camacho, SCARLET  "

## 2021-11-05 ASSESSMENT — ANXIETY QUESTIONNAIRES: GAD7 TOTAL SCORE: 13

## 2022-05-17 ENCOUNTER — OFFICE VISIT (OUTPATIENT)
Dept: FAMILY MEDICINE | Facility: CLINIC | Age: 57
End: 2022-05-17

## 2022-05-17 VITALS
OXYGEN SATURATION: 99 % | HEIGHT: 68 IN | RESPIRATION RATE: 14 BRPM | DIASTOLIC BLOOD PRESSURE: 76 MMHG | WEIGHT: 188 LBS | SYSTOLIC BLOOD PRESSURE: 123 MMHG | BODY MASS INDEX: 28.49 KG/M2 | TEMPERATURE: 96.9 F | HEART RATE: 60 BPM

## 2022-05-17 DIAGNOSIS — S61.412A SUPERFICIAL LACERATION OF LEFT HAND, INITIAL ENCOUNTER: Primary | ICD-10-CM

## 2022-05-17 PROCEDURE — 99213 OFFICE O/P EST LOW 20 MIN: CPT | Performed by: PHYSICIAN ASSISTANT

## 2022-05-17 ASSESSMENT — PAIN SCALES - GENERAL: PAINLEVEL: NO PAIN (0)

## 2022-05-17 NOTE — PROGRESS NOTES
"  Assessment & Plan       ICD-10-CM    1. Superficial laceration of left hand, initial encounter  S61.412A  ADHESIVE SKIN CLOSURE, DERMABOND      Talk to patient about his concerns.  His laceration was soaked in Hibiclens saline solution.  It appears to be superficial.  I placed Dermabond adhesive over it after that dry we placed a bandage.  We talked about wound care.  Follow-up in a week as needed.    Return in about 1 week (around 5/24/2022) for recheck, As Needed.    SKYLER Sheehan [facility]   Modesto is a 56 year old who presents for the following health issues  accompanied by his self.    HPI     Concern - cut on hand  Onset: 1hr ago  Description: cut  Intensity: mild  Progression of Symptoms:  same  Accompanying Signs & Symptoms: no pain  Previous history of similar problem: no  Precipitating factors:        Worsened by: n/a  Alleviating factors:        Improved by: n/a  Therapies tried and outcome:  none     Patient comes in today with a laceration on his left hand.  He works as a  and was trying to loosen a bolt that his hand slipped and he cut it on something sharp.  He has a bandage on it today.  He denies numbness tingling or loss of function.  Last tetanus shot was 2019.        Review of Systems   Constitutional, HEENT, cardiovascular, pulmonary, gi and gu systems are negative, except as otherwise noted.      Objective    /76   Pulse 60   Temp 96.9  F (36.1  C) (Tympanic)   Resp 14   Ht 1.727 m (5' 8\")   Wt 85.3 kg (188 lb)   SpO2 99%   BMI 28.59 kg/m    Body mass index is 28.59 kg/m .  Physical Exam   GENERAL: healthy, alert and no distress  SKIN: Left hand shows superficial 1 cm laceration over his third M CP joint region.  He has full range of motion of his fingers with 5 5 strength.  Neuro vas intact distally.            "

## 2022-05-29 ENCOUNTER — HEALTH MAINTENANCE LETTER (OUTPATIENT)
Age: 57
End: 2022-05-29

## 2022-10-02 ENCOUNTER — HEALTH MAINTENANCE LETTER (OUTPATIENT)
Age: 57
End: 2022-10-02

## 2023-06-04 ENCOUNTER — HEALTH MAINTENANCE LETTER (OUTPATIENT)
Age: 58
End: 2023-06-04

## 2024-04-19 ENCOUNTER — ANCILLARY PROCEDURE (OUTPATIENT)
Dept: GENERAL RADIOLOGY | Facility: CLINIC | Age: 59
End: 2024-04-19
Attending: FAMILY MEDICINE
Payer: COMMERCIAL

## 2024-04-19 ENCOUNTER — OFFICE VISIT (OUTPATIENT)
Dept: FAMILY MEDICINE | Facility: CLINIC | Age: 59
End: 2024-04-19
Payer: COMMERCIAL

## 2024-04-19 VITALS
HEIGHT: 69 IN | BODY MASS INDEX: 29.74 KG/M2 | HEART RATE: 61 BPM | WEIGHT: 200.8 LBS | DIASTOLIC BLOOD PRESSURE: 70 MMHG | SYSTOLIC BLOOD PRESSURE: 146 MMHG | TEMPERATURE: 98.1 F | OXYGEN SATURATION: 99 % | RESPIRATION RATE: 16 BRPM

## 2024-04-19 DIAGNOSIS — M79.605 LEG PAIN, BILATERAL: Primary | ICD-10-CM

## 2024-04-19 DIAGNOSIS — M79.605 LEG PAIN, BILATERAL: ICD-10-CM

## 2024-04-19 DIAGNOSIS — M79.604 LEG PAIN, BILATERAL: Primary | ICD-10-CM

## 2024-04-19 DIAGNOSIS — M79.604 LEG PAIN, BILATERAL: ICD-10-CM

## 2024-04-19 PROCEDURE — 72100 X-RAY EXAM L-S SPINE 2/3 VWS: CPT | Mod: TC | Performed by: INTERNAL MEDICINE

## 2024-04-19 PROCEDURE — 99214 OFFICE O/P EST MOD 30 MIN: CPT | Performed by: FAMILY MEDICINE

## 2024-04-19 RX ORDER — TIZANIDINE 2 MG/1
2 TABLET ORAL 3 TIMES DAILY PRN
Qty: 30 TABLET | Refills: 1 | Status: SHIPPED | OUTPATIENT
Start: 2024-04-19

## 2024-04-19 ASSESSMENT — PAIN SCALES - GENERAL: PAINLEVEL: MILD PAIN (3)

## 2024-04-19 ASSESSMENT — ENCOUNTER SYMPTOMS: LEG PAIN: 1

## 2024-04-19 NOTE — PROGRESS NOTES
ASSESSMENT / PLAN:  (M79.604,  M79.605) Leg pain, bilateral  (primary encounter diagnosis)  Comment: hamgstrings vs lumbar radiculopathy vs ?  Plan: XR Lumbar Spine 2/3 Views, tiZANidine         (ZANAFLEX) 2 MG tablet, Physical Therapy          Referral        Will ask for p.t. help. Heat and prn nsaids and trial of prn zanaflex. Consider spine vs sports med input if not improving with p.t.. continue electrolytes and keep hydrated cut down on gym until improving. Expected course and warning signs reviewed. .clale  Reveiwed risks and side effects of medication        Damien Salvador is a 58 year old, presenting for the following health issues:  Bilateral leg pain. Past 7months. No recent injury but 2 years ago - popping noise behind knee.  Bilateral hamstings into feet.   Stiffness and soreness. No weakness.   Better with sitting. Works .  Going to gym regularly. Thought was overuse and stopped for 2 weeks - not better.   Some shocking sensations. Lies on stomach with sleeping.   Some back issues in past- seen chiropractor - resolved.  No family history herniated.   No bm/bladder. No rashes. No swelling.   Tried compression stocking - not helpful.   Tried knee brace. Occasionally cramping but sleep overal.  Taking electrolytes. Water intake ok. 2 cups coffee in AM. ALCOHOL none. Non-smoker.   No history dvt.   Some ibuprofen.   Leg Pain (bilateral)      4/19/2024     8:58 AM   Additional Questions   Roomed by NANDO Jo CMA     Leg Pain    History of Present Illness       Reason for visit:  Soreness and fatigue in lower legs.also stiffness and muscle cramps in hamstrings with soreness on bottom of feet    He eats 4 or more servings of fruits and vegetables daily.He consumes 0 sweetened beverage(s) daily.He exercises with enough effort to increase his heart rate 30 to 60 minutes per day.  He exercises with enough effort to increase his heart rate 6 days per week.   He is taking medications regularly.    "          Objective    BP (!) 146/70   Pulse 61   Temp 98.1  F (36.7  C) (Oral)   Resp 16   Ht 1.753 m (5' 9\")   Wt 91.1 kg (200 lb 12.8 oz)   SpO2 99%   BMI 29.65 kg/m    Body mass index is 29.65 kg/m .  Physical Exam   GENERAL: alert and no distress  ABDOMEN: soft, nontender, no hepatosplenomegaly, no masses and bowel sounds normal  MS: no gross musculoskeletal defects noted, no edema  SKIN: no suspicious lesions or rashes  NEURO: Normal strength and tone, mentation intact and speech normal  PSYCH: mentation appears normal, affect normal/bright            Signed Electronically by: Joni Moreira MD    "

## 2024-07-27 ENCOUNTER — HEALTH MAINTENANCE LETTER (OUTPATIENT)
Age: 59
End: 2024-07-27

## 2025-08-10 ENCOUNTER — HEALTH MAINTENANCE LETTER (OUTPATIENT)
Age: 60
End: 2025-08-10